# Patient Record
(demographics unavailable — no encounter records)

---

## 2024-12-13 NOTE — DISCUSSION/SUMMARY
Subjective   Patient ID: Kofi is a 64 FreeTextEntry1] : 24 y/o  with persistent pelvic pain for almost a year now, somewhat worse with menses, only imaging finding is a reoccurring left ovarian cyst, absent on most recent sono , no evidence of endometriosis on recent MRI. Pain had finally improved for 2 months after being on Depot Provera since July, but just returned again this past 2 weeks. Patient also concerned about weight gain.  #Reoccuring cysts - In context of multiple sonos over past 3 months that seem to show cyst reoccurring and resolving, it is possible that her severe pain episodes are due to cysts occurring and rupturing, or just rapidly expanding given their large size every time she presents to the ED with quick resolution thereafter. - In terms of managing reoccurring cysts, best option would typically be combined OCPs but given Migraines with aura this is contraindicated so there are no other great options for trying to prevent this. Depot Provera may suppress ovulation some and might help, but that is not its primary mechanism of action so it may not be effective for this particular purpose. - Pt was worked up by hematology for vWF and bleeding disorders in  and was negative - Discussed continuing to use Depot Provera as treatment and continuing to monitor, advised unless pain returns, no further sono needed, pt in agreement with plan - Given most recent pain episode has improved, no need for sono at this time, but will repeat sono with next follow up visit to monitor.  #Chronic pelvic pain - Discussed cause of this still unclear, but given improvement with Depot Provera, gyn origina and endometriosis may still be possible. - Given approaching 6 months of use, although with some recent improvement prior to relapse this past 2 weeks, discussed continuing Depot Provera again for an additional dose to see if symptoms resolve. Discussed alternatives of laparoscopy to evaluate for possible endometriosis vs GnRH antagonists vs Mirena. Pt wishes to continue Depot Provera for now. - Discussed weight gain and advised that this likely can be related to Depot Provera. Discussed mechanism of increased appetite and discussed diet control if possible. Reviewed weighing this side effect with benefit of pain improvement, and pt expressed understanding and wishes to continue for now. Discussed that if after next injection she is not satisfied with improvement in symptoms, can try a different therapy especially in context of side effects. Discussed that cause of fatigue is less clear, typically not a side effect of Depot Provera but given temporarily she reports it started when the injections started, it may be related. - C/w naproxen and Tylenol as needed for pain - Will f/u in 2 months to reassess symptoms after more time on Depot Provera. Sono to be done at that visit. If pain not resolved by then will likely change therapy. Pt leaning towards laparoscopy if this is the case, but will also revisit Mirena and GnRH antagonists.  Jesse Medina MD. Leora Thomson MD, 3/17/2023

## 2024-12-13 NOTE — HISTORY OF PRESENT ILLNESS
[PapSmeardate] : 06/21/2024 [TextBox_31] : neg  [HIVDate] : 06/21/2024 [TextBox_53] : neg  [SyphilisDate] : 06/21/2024 [TextBox_58] : neg  [GonorrheaDate] : 06/21/2024 [TextBox_63] : neg  [ChlamydiaDate] : 06/21/2024 [TextBox_68] : neg  [TrichomonasDate] : 06/21/2024 [TextBox_73] : neg  [HPVDate] : n/a [LMPDate] : 11/29/2024 [PGHxTotal] : 2 [[facility]] : 1 [[facility]] : 1 [PGxEctopic] : 1 [FreeTextEntry1] : 11/29/2024

## 2025-02-28 NOTE — HISTORY OF PRESENT ILLNESS
[N] : Patient reports normal menses [DepoProvera] : uses depo-medroxyprogesterone [Y] : Positive pregnancy history [Menarche Age: ____] : age at menarche was [unfilled] [No] : Patient does not have concerns regarding sex [PapSmeardate] : 06/21/24 [TextBox_31] : NEG [PGHxTotal] : 2 [[facility]] : 1 [[facility] Hospitaliving] : 1 [PGxEctopic] : 1 [FreeTextEntry1] : : 1

## 2025-02-28 NOTE — DISCUSSION/SUMMARY
[FreeTextEntry1] : 22 y/o  with persistent pelvic pain for almost a year now, somewhat worse with menses, only imaging finding is a reoccurring left ovarian cyst, no cysts on sono today, no evidence of endometriosis on recent MRI. Main pain has not returned since November episode, though is still getting cyclic spotting with some mild pain.  #Reoccuring cysts - In context of multiple sonos over past 3 months that seem to show cyst reoccurring and resolving, it is possible that her severe pain episodes are due to cysts occurring and rupturing, or just rapidly expanding given their large size every time she presents to the ED with quick resolution thereafter. - In terms of managing reoccurring cysts, best option would typically be combined OCPs but given Migraines with aura this is contraindicated so there are no other great options for trying to prevent this. Depot Provera may suppress ovulation some and might help, but that is not its primary mechanism of action so it may not be effective for this particular purpose. - Pt was worked up by hematology for vWF and bleeding disorders in  and was negative - Discussed that given no pain since November, it seems Depot Provera may be helping with this and given good tolerance, would recommend continuing. Pt expressed understanding, wishes to continue Depot Provera.  #Chronic pelvic pain - Cause of this unclear, but given improvement with Depot Provera, gyn origin and endometriosis may still be possible. - Given no pain since last visit, appears Depot Provera is working. Would recommend continuing if side effects are not bothersome, and pt in agreement with continuing depot Provera. - Discussed weight gain and advised that this likely can be related to Depot Provera. Reviewed weight stable compared to last visit however which is reassuring - Plan to continue Depot Provera unless pain starts to return more severely again. If that were to happen, would need to consider different management. Pt leaning towards laparoscopy if this is the case, but will also revisit Mirena and GnRH antagonists. - Return in 4 months for annual visit and subsequent Depot Provera dose (pt scheduled for next one in 1 month already)  Jesse Medina MD.

## 2025-04-02 NOTE — ASSESSMENT
[FreeTextEntry1] : 22 y/o female presents as follow up for SSA+. Pt previously saw Dr. Ronna García (rheum) for positive BRISSA, SSA+, dry eyes, easy bruising. Rest of extensive autoimmune workup in 9/2021 was negative. Pt was advised on starting HCQ for SSA+ in setting of pregnancy. Pt was seen by heme for low titer APLS Ab+. No Hx of thromboembolism. Pt was last seen by rheum in 10/2021. Pt had Hx of cholestasis. Pt reports worsening dry eyes. Pt reports more frequent low back pain with episodes of RLE numbness. Pt is undergoing PT, pending possible MR if refractory. Pt follows with ophthalmologist, last seen 4/2023, uses eye drops PRN.  Patient has positive SSA with sicca symptoms, fatigue. Given pt's positive SSA Ab, pt should be treated with HCQ during pregnancy. Decision on whether to treat patient with HCQ chronically would depend on whether or not she has SLE or Sjogren's with significant symptoms.  Pt reports having few days of b/l bottom of heels pains (worst in the morning with first steps) and recurrent b/l hand pains around palmar side of 3rd digit. Discussed that foot pain is likely plantar fasciitis and hand pains may be more tendinitis. Given no symptoms of inflammatory arthritis and has only been going on for the last few days, less likely to be related with underlying Sjogren's.  - Obtain labwork to evaluate for signs of active Sjogren's - Obtain US of b/l hands - Advised on NSAIDs, heat, stretching (e.g. rolling bottle under feet) for plantar fasciitis - Advised on HCQ during pregnancies - Continue follow up with heme/onc for starting iron infusions and low positive cardiolipin Abs (unlikely to be clinically significant) - Will contact pt with above results for any intervention needed

## 2025-04-02 NOTE — HISTORY OF PRESENT ILLNESS
[FreeTextEntry1] : HISTORY: 22 y/o female presents as follow up for SSA+. Pt previously saw Dr. Ronna García (rheum) for positive BRISSA, SSA+, dry eyes, easy bruising. Rest of extensive autoimmune workup in 9/2021 was negative. Pt was advised on starting HCQ for SSA+ in setting of pregnancy. Pt was seen by heme for low titer APLS Ab+. No Hx of thromboembolism. Pt was last seen by rheum in 10/2021. Pt had Hx of cholestasis. Pt reports worsening dry eyes. Pt reports more frequent low back pain with episodes of RLE numbness. Pt is undergoing PT, pending possible MR if refractory. Pt follows with ophthalmologist, last seen 4/2023, uses eye drops PRN.  Patient has positive SSA with sicca symptoms, fatigue. Given pt's positive SSA Ab, pt should be treated with HCQ during pregnancy. Decision on whether to treat patient with HCQ chronically would depend on whether or not she has SLE or Sjogren's with significant symptoms.  INTERVAL HISTORY: Pt reports having few days of b/l bottom of heels pains (worst in the morning with first steps) and recurrent b/l hand pains around palmar side of 3rd digit. Discussed that foot pain is likely plantar fasciitis and hand pains may be more tendinitis. Given no symptoms of inflammatory arthritis and has only been going on for the last few days, less likely to be related with underlying Sjogren's.  WORKUP: Remarkable for (10/2023): AST 53, ALT 50 (LFTs later normalized), Alk Phos 152, ESR 34, BRISSA 1:320 speckled, SSA++, TPO Ab++, CL IgG 25.6, IgA 30.0, ferritin 7, iron sat 4% Normal/neg (10/2023): UPCR, CRP, dsDNA, Lester, RNP, C3, C4, LA, RF, CCP, CPK, B12, folate, TSH, 14.3.3 eta protein

## 2025-06-26 NOTE — HISTORY OF PRESENT ILLNESS
[LMP unknown] : LMP unknown [DepoProvera] : uses depo-medroxyprogesterone [Y] : Positive pregnancy history [unknown] : Patient is unsure of the date of her LMP [Menarche Age: ____] : age at menarche was [unfilled] [No] : Patient does not have concerns regarding sex [PapSmeardate] : 06/21/24 [TextBox_31] : NEG [HIVDate] : 06/21/24 [TextBox_53] : NEG [SyphilisDate] : 06/21/24 [TextBox_58] : NEG [GonorrheaDate] : 06/21/24 [TextBox_63] : NEG [ChlamydiaDate] : 06/21/24 [TextBox_68] : NEG [TrichomonasDate] : 06/21/24 [TextBox_73] : NEG [HepatitisBDate] : 06/21/24 [TextBox_83] : NEG [HepatitisCDate] : 06/21/24 [TextBox_88] : NEG [PGHxTotal] : 2 [[facility] CenterxFulerm] : 1 [[facility] Centeriving] : 1 [PGxEctopic] : 1 [FreeTextEntry1] :  X1

## 2025-06-26 NOTE — DISCUSSION/SUMMARY
[FreeTextEntry1] : 23 y/o  with persistent pelvic pain for almost a year now, somewhat worse with menses, only imaging finding is a reoccurring left ovarian cyst, no cysts on most recent sono, no evidence of endometriosis on recent MRI. Main pain has not returned since November episode, though is still getting cyclic spotting with some mild pain that is still bothersome and she has gained 30 lbs on Depot Provera.  #Reoccuring cysts - In context of multiple sonos over past 3 months that seem to show cyst reoccurring and resolving, it is possible that her severe pain episodes are due to cysts occurring and rupturing, or just rapidly expanding given their large size every time she presents to the ED with quick resolution thereafter. - Pt was worked up by hematology for vWF and bleeding disorders in  and was negative - Given desire to switch off of Depot Provera, and further discussion about migraine hx with pt reporting she gianluca has not had this issue since pregnancy now, discussed option of revisiting COCs. Reviewed if she does truly have migraines with aura, COCs carry a small increased risk of stroke, but given she may not have this condition, it is reasonable to try the pills. Pt expressed understanding, wishes to trial COCs - Junel  prescribed today. Advised given menstrual symptoms in past as well to take these in a continuous fashion to avoid menses. Reviewed possible break through bleeding. Lastly reviewed while these will not cause further weight gain, they unfortunately will not directly help her lose weight either. Pt expressed understanding.  #Chronic pelvic pain - Cause of this unclear, but given improvement with Depot Provera, gyn origin and endometriosis may still be possible. - Given improvement with Depot Provera, reviewed that COCs should also help with this. - Reviewed alternative of GnRH antagonists and laparoscopy for pain as well. Pt not interested in these at this time.  - Return in 3 months for annual visit with sono and to assess response to continuous COCs  Jesse Medina MD.

## 2025-06-26 NOTE — HISTORY OF PRESENT ILLNESS
[LMP unknown] : LMP unknown [DepoProvera] : uses depo-medroxyprogesterone [Y] : Positive pregnancy history [unknown] : Patient is unsure of the date of her LMP [Menarche Age: ____] : age at menarche was [unfilled] [No] : Patient does not have concerns regarding sex [PapSmeardate] : 06/21/24 [TextBox_31] : NEG [HIVDate] : 06/21/24 [TextBox_53] : NEG [SyphilisDate] : 06/21/24 [TextBox_58] : NEG [GonorrheaDate] : 06/21/24 [TextBox_63] : NEG [ChlamydiaDate] : 06/21/24 [TextBox_68] : NEG [TrichomonasDate] : 06/21/24 [TextBox_73] : NEG [HepatitisBDate] : 06/21/24 [TextBox_83] : NEG [HepatitisCDate] : 06/21/24 [TextBox_88] : NEG [PGHxTotal] : 2 [[facility] CenterxFulerm] : 1 [[facility]aleiving] : 1 [PGxEctopic] : 1 [FreeTextEntry1] :  X1

## 2025-06-26 NOTE — DISCUSSION/SUMMARY
[FreeTextEntry1] : 25 y/o  with persistent pelvic pain for almost a year now, somewhat worse with menses, only imaging finding is a reoccurring left ovarian cyst, no cysts on most recent sono, no evidence of endometriosis on recent MRI. Main pain has not returned since November episode, though is still getting cyclic spotting with some mild pain that is still bothersome and she has gained 30 lbs on Depot Provera.  #Reoccuring cysts - In context of multiple sonos over past 3 months that seem to show cyst reoccurring and resolving, it is possible that her severe pain episodes are due to cysts occurring and rupturing, or just rapidly expanding given their large size every time she presents to the ED with quick resolution thereafter. - Pt was worked up by hematology for vWF and bleeding disorders in  and was negative - Given desire to switch off of Depot Provera, and further discussion about migraine hx with pt reporting she gianluca has not had this issue since pregnancy now, discussed option of revisiting COCs. Reviewed if she does truly have migraines with aura, COCs carry a small increased risk of stroke, but given she may not have this condition, it is reasonable to try the pills. Pt expressed understanding, wishes to trial COCs - Junel  prescribed today. Advised given menstrual symptoms in past as well to take these in a continuous fashion to avoid menses. Reviewed possible break through bleeding. Lastly reviewed while these will not cause further weight gain, they unfortunately will not directly help her lose weight either. Pt expressed understanding.  #Chronic pelvic pain - Cause of this unclear, but given improvement with Depot Provera, gyn origin and endometriosis may still be possible. - Given improvement with Depot Provera, reviewed that COCs should also help with this. - Reviewed alternative of GnRH antagonists and laparoscopy for pain as well. Pt not interested in these at this time.  - Return in 3 months for annual visit with sono and to assess response to continuous COCs  Jesse Medina MD.